# Patient Record
Sex: FEMALE | Race: WHITE | NOT HISPANIC OR LATINO | Employment: OTHER | URBAN - METROPOLITAN AREA
[De-identification: names, ages, dates, MRNs, and addresses within clinical notes are randomized per-mention and may not be internally consistent; named-entity substitution may affect disease eponyms.]

---

## 2022-07-07 ENCOUNTER — TELEPHONE (OUTPATIENT)
Dept: OBGYN CLINIC | Facility: CLINIC | Age: 42
End: 2022-07-07

## 2022-07-07 DIAGNOSIS — Z12.31 ENCOUNTER FOR SCREENING MAMMOGRAM FOR BREAST CANCER: Primary | ICD-10-CM

## 2022-07-07 NOTE — TELEPHONE ENCOUNTER
Ann Calderon called in and was requesting an order for her Mammogram  She got notification from [facility] that she was due for her Mammogram in July and is past due  Ann Elick has a [facility] scheduled on 8/30/2022 with Dr Reji Johnson and would like to have her Mammogram completed prior to then  Informed Ubaldoleonila Kvng will fax to [facility] for her but our fax does not always go through so I will send her the mammogram order via the mail as well  Pt aware and agrees with plan

## 2022-07-19 ENCOUNTER — TELEPHONE (OUTPATIENT)
Dept: OBGYN CLINIC | Facility: CLINIC | Age: 42
End: 2022-07-19

## 2022-07-19 DIAGNOSIS — Z12.31 ENCOUNTER FOR SCREENING MAMMOGRAM FOR BREAST CANCER: ICD-10-CM

## 2022-08-19 ENCOUNTER — TELEPHONE (OUTPATIENT)
Dept: OBGYN CLINIC | Facility: CLINIC | Age: 42
End: 2022-08-19

## 2022-08-19 NOTE — TELEPHONE ENCOUNTER
Sam Brewer called into the nurses line regarding a concern with her current menstrual cycle  When she went to remove her menstrual cup and empty in the toilet she heard a large "splash" she examined what was in the toilet and saw what looked to be a clot, about the size of her hand cupped  She is not bleeding heavily where she has to emptying her cup frequently  She has had small clots since then  Advised her this is normal, I would continue to monitor  If she is bleeding heavily, emptying her cup frequently or bleeding past the cup, feeling light headed, dizzy to be evaluated in the Er  Informed her clots are the bodies way of trying to stop bleeding, as well as she may have more uterine lining to shed this cycle so clots can be larger, or smaller depending on the cycle  Pt aware and will continue to monitor

## 2022-08-30 ENCOUNTER — ANNUAL EXAM (OUTPATIENT)
Dept: OBGYN CLINIC | Facility: CLINIC | Age: 42
End: 2022-08-30
Payer: COMMERCIAL

## 2022-08-30 VITALS
BODY MASS INDEX: 24.04 KG/M2 | DIASTOLIC BLOOD PRESSURE: 82 MMHG | SYSTOLIC BLOOD PRESSURE: 124 MMHG | WEIGHT: 153.2 LBS | HEIGHT: 67 IN

## 2022-08-30 DIAGNOSIS — Z01.419 ENCOUNTER FOR ANNUAL ROUTINE GYNECOLOGICAL EXAMINATION: Primary | ICD-10-CM

## 2022-08-30 DIAGNOSIS — Z12.31 ENCOUNTER FOR SCREENING MAMMOGRAM FOR MALIGNANT NEOPLASM OF BREAST: ICD-10-CM

## 2022-08-30 DIAGNOSIS — N92.6 MENSTRUAL CHANGES: ICD-10-CM

## 2022-08-30 PROCEDURE — S0612 ANNUAL GYNECOLOGICAL EXAMINA: HCPCS | Performed by: OBSTETRICS & GYNECOLOGY

## 2022-08-30 RX ORDER — LOSARTAN POTASSIUM AND HYDROCHLOROTHIAZIDE 12.5; 5 MG/1; MG/1
TABLET ORAL
COMMUNITY
Start: 2022-08-13

## 2022-08-30 NOTE — LETTER
2022     Luli Jade, [address]    Patient: Douglas Magallanes   YOB: 1980   Date of Visit: 2022       Dear Dr Alexis Mota: Thank you for referring Douglas Magallanes to me for evaluation  Below are my notes for this consultation  If you have questions, please do not hesitate to call me  I look forward to following your patient along with you  Sincerely,        Abraham Traylor MD        CC: No Recipients  Abraham Traylor MD  2022 10:06 AM  Sign when Signing Visit  Annual [address] [facility] [address], [address], [facility], [address]    ASSESSMENT/PLAN: Douglas Magallanes is a 38 y o   who presents for annual gynecologic exam     Encounter for routine gynecologic examination  - Routine well woman exam completed today  - Cervical Cancer Screening: Current ASCCP Guidelines reviewed  Last Pap: 2021 normal  Next Pap Due: 2 years, routine   - STI screening offered including HIV testing: {stitestin}  - Contraceptive counseling discussed  Current contraception: vasectomy,   - Breast Cancer Screening: Last Mammogram 2022, normal  - The following were reviewed in today's visit: mammography screening ordered, menopause, exercise and healthy diet    Additional problems addressed during this visit:  1  Encounter for annual routine gynecological examination    2  Encounter for screening mammogram for malignant neoplasm of breast  -     Mammo screening bilateral w 3d & cad; Future; Expected date: 2023    3  Menstrual changes  Comments:  patient had one large clot with period last month, that was unusual for her  Periods still monthly  A/P:   Reviewed periods change with age  Menopause by definition is 12 months w/o menses, average age is 52yo - but this age can vary by 5 years either direction    Prior to that many changes in menses can occur - including change in frequency, change in amount, change in length  These are all usually normal, encouraged her to call if periods last longer than 10 days or more frequently than every 21 days  Menopausal symptoms can also occur during this time  Next visit: 1 year Wellness      CC:  Annual Gynecologic Examination    HPI: Manuel Simpson is a 38 y o   who presents for annual gynecologic examination  She denies any breast or urinary  issues at today's visit  Some external vaginal itching around clitoris last 2 days, no discharge  Just wants to check it out  Periods about monthly  Vasectomy for contraception  Sometimes mid month occasional spotting, especially if working out hard around that time - this is not new  Most recent period - c/o passed a clot the size of her palm and 1/2 thick on day 3 of menses  Some cramping before that  Otherwise normal overall  Gyn History  Patient's last menstrual period was 2022 (approximate)  Last Pap: 2021 was normal    She  reports being sexually active and has had partner(s) who are male  She reports using the following method of birth control/protection: Male Sterilization  OB History      Past Medical History:  No date: Hypertension     No past surgical history on file  Family History   Problem Relation Age of Onset    Hypertension Mother     Thrombosis Father     Stroke Father     Hypertension Father     Breast cancer Neg Hx     Uterine cancer Neg Hx     Ovarian cancer Neg Hx     Colon cancer Neg Hx         Social History     Tobacco Use    Smoking status: Former Smoker    Smokeless tobacco: Never Used   Vaping Use    Vaping Use: Never used   Substance Use Topics    Alcohol use: Yes     Comment: social    Drug use: Never          Current Outpatient Medications:     losartan-hydrochlorothiazide (HYZAAR) 50-12 5 mg per tablet, TAKE 1/2 TABLET BY MOUTH ONCE A DAY, Disp: , Rfl:     She has No Known Allergies       ROS negative except as noted in HPI    Objective:  /82   Ht 5' 7 25" (1 708 m)   Wt 69 5 kg (153 lb 3 2 oz)   LMP 08/16/2022 (Approximate)   Breastfeeding No   BMI 23 82 kg/m²      Physical Exam  Constitutional:       Appearance: Normal appearance  Chest:   Breasts:      Right: Normal  No mass, tenderness or axillary adenopathy  Left: Normal  No mass, tenderness or axillary adenopathy  Abdominal:      Palpations: Abdomen is soft  Tenderness: There is no abdominal tenderness  Genitourinary:     General: Normal vulva  Labia:         Right: No rash  Left: No rash  Vagina: No vaginal discharge, bleeding or lesions  Cervix: Normal       Uterus: Normal  Not tender  Adnexa:         Right: No mass or tenderness  Left: No mass or tenderness  Rectum: No external hemorrhoid  Musculoskeletal:         General: Normal range of motion  Lymphadenopathy:      Upper Body:      Right upper body: No axillary adenopathy  Left upper body: No axillary adenopathy  Neurological:      Mental Status: She is alert and oriented to person, place, and time     Psychiatric:         Mood and Affect: Mood normal          Behavior: Behavior normal

## 2022-08-30 NOTE — PROGRESS NOTES
Annual Wellness Visit  [address], [address], [facility], [address] [facility]    ASSESSMENT/PLAN: Anuel Ocampo is a 38 y o   who presents for annual gynecologic exam     Encounter for routine gynecologic examination  - Routine well woman exam completed today  - Cervical Cancer Screening: Current ASCCP Guidelines reviewed  Last Pap: 2021 normal  Next Pap Due: 2 years, routine   - STI screening offered including HIV testing: offered, pt declined  - Contraceptive counseling discussed  Current contraception: vasectomy,   - Breast Cancer Screening: Last Mammogram 2022, normal  - The following were reviewed in today's visit: mammography screening ordered, menopause, exercise and healthy diet    Additional problems addressed during this visit:  1  Encounter for annual routine gynecological examination    2  Encounter for screening mammogram for malignant neoplasm of breast  -     Mammo screening bilateral w 3d & cad; Future; Expected date: 2023    3  Menstrual changes  Comments:  patient had one large clot with period last month, that was unusual for her  Periods still monthly  A/P:   Reviewed periods change with age  Menopause by definition is 12 months w/o menses, average age is 54yo - but this age can vary by 5 years either direction  Prior to that many changes in menses can occur - including change in frequency, change in amount, change in length  These are all usually normal, encouraged her to call if periods last longer than 10 days or more frequently than every 21 days  Menopausal symptoms can also occur during this time  Next visit: 1 year Wellness      CC:  Annual Gynecologic Examination    HPI: Anuel Ocampo is a 38 y o   who presents for annual gynecologic examination  She denies any breast or urinary  issues at today's visit  Some external vaginal itching around clitoris last 2 days, no discharge  Just wants to check it out      Periods about monthly  Vasectomy for contraception  Sometimes mid month occasional spotting, especially if working out hard around that time - this is not new  Most recent period - c/o passed a clot the size of her palm and 1/2 thick on day 3 of menses  Some cramping before that  Otherwise normal overall  Gyn History  Patient's last menstrual period was 2022 (approximate)  Last Pap: 2021 was normal    She  reports being sexually active and has had partner(s) who are male  She reports using the following method of birth control/protection: Male Sterilization  OB History      Past Medical History:  No date: Hypertension     No past surgical history on file  Family History   Problem Relation Age of Onset    Hypertension Mother     Thrombosis Father     Stroke Father     Hypertension Father     Breast cancer Neg Hx     Uterine cancer Neg Hx     Ovarian cancer Neg Hx     Colon cancer Neg Hx         Social History     Tobacco Use    Smoking status: Former Smoker    Smokeless tobacco: Never Used   Vaping Use    Vaping Use: Never used   Substance Use Topics    Alcohol use: Yes     Comment: social    Drug use: Never          Current Outpatient Medications:     losartan-hydrochlorothiazide (HYZAAR) 50-12 5 mg per tablet, TAKE 1/2 TABLET BY MOUTH ONCE A DAY, Disp: , Rfl:     She has No Known Allergies       ROS negative except as noted in HPI    Objective:  /82   Ht 5' 7 25" (1 708 m)   Wt 69 5 kg (153 lb 3 2 oz)   LMP 2022 (Approximate)   Breastfeeding No   BMI 23 82 kg/m²      Physical Exam  Constitutional:       Appearance: Normal appearance  Chest:   Breasts:      Right: Normal  No mass, tenderness or axillary adenopathy  Left: Normal  No mass, tenderness or axillary adenopathy  Abdominal:      Palpations: Abdomen is soft  Tenderness: There is no abdominal tenderness  Genitourinary:     General: Normal vulva        Labia:         Right: No rash          Left: No rash  Vagina: No vaginal discharge, bleeding or lesions  Cervix: Normal       Uterus: Normal  Not tender  Adnexa:         Right: No mass or tenderness  Left: No mass or tenderness  Rectum: No external hemorrhoid  Musculoskeletal:         General: Normal range of motion  Lymphadenopathy:      Upper Body:      Right upper body: No axillary adenopathy  Left upper body: No axillary adenopathy  Neurological:      Mental Status: She is alert and oriented to person, place, and time     Psychiatric:         Mood and Affect: Mood normal          Behavior: Behavior normal

## 2022-08-30 NOTE — LETTER
2022     Christopher Neri, [address]    Patient: China Koehler   YOB: 1980   Date of Visit: 2022       Dear Dr Ching Gibson: Thank you for referring China Koehler to me for evaluation  Below are my notes for this consultation  If you have questions, please do not hesitate to call me  I look forward to following your patient along with you  Sincerely,        Jennifer Brown MD        CC: No Recipients  Jennifer Brown MD  2022 10:06 AM  Sign when Signing Visit  Annual [address] [address], [address], [facility], [address]    ASSESSMENT/PLAN: China Koehler is a 38 y o   who presents for annual gynecologic exam     Encounter for routine gynecologic examination  - Routine well woman exam completed today  - Cervical Cancer Screening: Current ASCCP Guidelines reviewed  Last Pap: 2021 normal  Next Pap Due: 2 years, routine   - STI screening offered including HIV testing: offered, pt declined  - Contraceptive counseling discussed  Current contraception: vasectomy,   - Breast Cancer Screening: Last Mammogram 2022, normal  - The following were reviewed in today's visit: mammography screening ordered, menopause, exercise and healthy diet    Additional problems addressed during this visit:  1  Encounter for annual routine gynecological examination    2  Encounter for screening mammogram for malignant neoplasm of breast  -     Mammo screening bilateral w 3d & cad; Future; Expected date: 2023    3  Menstrual changes  Comments:  patient had one large clot with period last month, that was unusual for her  Periods still monthly  A/P:   Reviewed periods change with age  Menopause by definition is 12 months w/o menses, average age is 54yo - but this age can vary by 5 years either direction    Prior to that many changes in menses can occur - including change in frequency, change in amount, change in length  These are all usually normal, encouraged her to call if periods last longer than 10 days or more frequently than every 21 days  Menopausal symptoms can also occur during this time  Next visit: 1 year Wellness      CC:  Annual Gynecologic Examination    HPI: Jacqueline Hoover is a 38 y o   who presents for annual gynecologic examination  She denies any breast or urinary  issues at today's visit  Some external vaginal itching around clitoris last 2 days, no discharge  Just wants to check it out  Periods about monthly  Vasectomy for contraception  Sometimes mid month occasional spotting, especially if working out hard around that time - this is not new  Most recent period - c/o passed a clot the size of her palm and 1/2 thick on day 3 of menses  Some cramping before that  Otherwise normal overall  Gyn History  Patient's last menstrual period was 2022 (approximate)  Last Pap: 2021 was normal    She  reports being sexually active and has had partner(s) who are male  She reports using the following method of birth control/protection: Male Sterilization  OB History      Past Medical History:  No date: Hypertension     No past surgical history on file  Family History   Problem Relation Age of Onset    Hypertension Mother     Thrombosis Father     Stroke Father     Hypertension Father     Breast cancer Neg Hx     Uterine cancer Neg Hx     Ovarian cancer Neg Hx     Colon cancer Neg Hx         Social History     Tobacco Use    Smoking status: Former Smoker    Smokeless tobacco: Never Used   Vaping Use    Vaping Use: Never used   Substance Use Topics    Alcohol use: Yes     Comment: social    Drug use: Never          Current Outpatient Medications:     losartan-hydrochlorothiazide (HYZAAR) 50-12 5 mg per tablet, TAKE 1/2 TABLET BY MOUTH ONCE A DAY, Disp: , Rfl:     She has No Known Allergies       ROS negative except as noted in HPI    Objective:  /82   Ht 5' 7 25" (1 708 m)   Wt 69 5 kg (153 lb 3 2 oz)   LMP 08/16/2022 (Approximate)   Breastfeeding No   BMI 23 82 kg/m²      Physical Exam  Constitutional:       Appearance: Normal appearance  Chest:   Breasts:      Right: Normal  No mass, tenderness or axillary adenopathy  Left: Normal  No mass, tenderness or axillary adenopathy  Abdominal:      Palpations: Abdomen is soft  Tenderness: There is no abdominal tenderness  Genitourinary:     General: Normal vulva  Labia:         Right: No rash  Left: No rash  Vagina: No vaginal discharge, bleeding or lesions  Cervix: Normal       Uterus: Normal  Not tender  Adnexa:         Right: No mass or tenderness  Left: No mass or tenderness  Rectum: No external hemorrhoid  Musculoskeletal:         General: Normal range of motion  Lymphadenopathy:      Upper Body:      Right upper body: No axillary adenopathy  Left upper body: No axillary adenopathy  Neurological:      Mental Status: She is alert and oriented to person, place, and time     Psychiatric:         Mood and Affect: Mood normal          Behavior: Behavior normal

## 2025-07-31 ENCOUNTER — HOSPITAL ENCOUNTER (OUTPATIENT)
Age: 45
Discharge: HOME | End: 2025-07-31
Attending: NURSE PRACTITIONER
Payer: COMMERCIAL

## 2025-07-31 VITALS — HEIGHT: 67 IN | WEIGHT: 160 LBS | BODY MASS INDEX: 25.11 KG/M2

## 2025-07-31 DIAGNOSIS — Z12.31 ENCOUNTER FOR SCREENING MAMMOGRAM FOR MALIGNANT NEOPLASM OF BREAST: ICD-10-CM

## 2025-07-31 PROCEDURE — 77067 SCR MAMMO BI INCL CAD: CPT

## 2025-07-31 PROCEDURE — 77063 BREAST TOMOSYNTHESIS BI: CPT

## 2025-08-08 DIAGNOSIS — R92.30 DENSE BREASTS, UNSPECIFIED: ICD-10-CM
